# Patient Record
Sex: FEMALE | URBAN - METROPOLITAN AREA
[De-identification: names, ages, dates, MRNs, and addresses within clinical notes are randomized per-mention and may not be internally consistent; named-entity substitution may affect disease eponyms.]

---

## 2022-08-25 ENCOUNTER — APPOINTMENT (RX ONLY)
Dept: URBAN - METROPOLITAN AREA CLINIC 23 | Facility: CLINIC | Age: 37
Setting detail: DERMATOLOGY
End: 2022-08-25

## 2022-08-25 DIAGNOSIS — Z41.9 ENCOUNTER FOR PROCEDURE FOR PURPOSES OTHER THAN REMEDYING HEALTH STATE, UNSPECIFIED: ICD-10-CM

## 2022-09-07 ENCOUNTER — APPOINTMENT (RX ONLY)
Dept: URBAN - METROPOLITAN AREA CLINIC 23 | Facility: CLINIC | Age: 37
Setting detail: DERMATOLOGY
End: 2022-09-07

## 2022-09-07 DIAGNOSIS — Z41.9 ENCOUNTER FOR PROCEDURE FOR PURPOSES OTHER THAN REMEDYING HEALTH STATE, UNSPECIFIED: ICD-10-CM

## 2022-09-07 PROCEDURE — ? PICOWAY LASER

## 2022-09-07 PROCEDURE — ? IN-HOUSE DISPENSING PHARMACY

## 2022-09-07 PROCEDURE — ? ADDITIONAL NOTES

## 2022-09-07 NOTE — PROCEDURE: IN-HOUSE DISPENSING PHARMACY
Product 68 Units Dispensed: 0
Product 27 Unit Type: mg
Product 1 Amount/Unit (Numbers Only): 1
Name Of Product 3: Hydroquinone brightening pads 10%
Send Charges To Patient Encounter: No
Name Of Product 20: June Sandovaler
Product 12 Refills: 2
Product 13 Application Directions: Apply to wound site every 48 hours as indicated.
Product 4 Unit Type: tablets
Name Of Product 18: Syed Bales
Product 7 Application Directions: Apply one drop in each eye
Product 7 Unit Type: kit(s)
Name Of Product 5: Latisse 3ml
Name Of Product 8: Prednisone 20mg
Product 2 Price/Unit (In Dollars): 75
Product 16 Application Directions: Apply to lashes at bedtime only
Product 21 Application Directions: Use as directed
Product 9 Application Directions: Apply to affected area on the face area am as indicated.
Name Of Product 16: Latisse
Name Of Product 4: Archie Fang
Product 19 Application Directions: Apply once daily to each eye.
Name Of Product 14: Valtrex 500mg
Product 17 Application Directions: Take one tablet 20 mg today in office and 10 mg tomorrow if needed for swelling. Dispense in office.
Product 6 Unit Type: grams
Product 9 Unit Type: jar(s)
Product 16 Unit Type: ml
Product 2 Application Directions: Apply a thin layer to face at bedtime only
Product 10 Unit Type: bottle(s)
Name Of Product 7: Shawna Grant
Name Of Product 12: Skin Better Even tone Serum
Name Of Product 22: Calcipotriene/ 5 FLuoracil cream
Name Of Product 17: Prednisone 20 mg
Detail Level: Detailed
Product 4 Application Directions: Apply to face at bedtime.
Product 2 Unit Type: tube(s)
Product 1 Application Directions: Apply to entire face before tretinoin at night time
Name Of Product 6: Tretinoin 0.05% cream
Product 15 Application Directions: Apply to affected skin as indicated
Name Of Product 19: Upneeq ophthalmic solution
Name Of Product 9: Brightening pads 6%
Product 14 Application Directions: Take one tablet prior to procedure. Continue twice daily x 3 days as indicated.
Product 15 Amount/Unit (Numbers Only): [address] [address]
Product 22 Application Directions: Apply small amount to temple and forehead twice daily as indicated x 3 or 5 days
Product 12 Application Directions: Apply to the entire face in the Am and Pm
Name Of Product 2: Tretinoin
Product 14 Amount/Unit (Numbers Only): [address]
Product 11 Application Directions: Take one today and tomorrow
Product 10 Application Directions: Take  1 tablet
Product 6 Application Directions: Apply pea size amount to the face at bedtime only.
Product 13 Unit Type: unit(s)
Product 3 Application Directions: Apply to face am as indicated
Product 20 Application Directions: Apply thin layer at bedtime to entire face
Product 6 Amount/Unit (Numbers Only): 60 [facility]
Name Of Product 15: Justina Londono
Product 16 Amount/Unit (Numbers Only): 5
Name Of Product 1: Brightening pads 10%
Product 8 Application Directions: Take 1 tablet after procedure
Name Of Product 13: Duoderm thin
Product 5 Application Directions: Apply 1 drop to both Eyelashes at bedtime only
Product 13 Price/Unit (In Dollars): $10.00
Name Of Product 10: Prednisone 10mg
Name Of Product 21: Defenage skincare

## 2022-09-07 NOTE — PROCEDURE: ADDITIONAL NOTES
Detail Level: Detailed
Render Risk Assessment In Note?: no
Additional Notes: Patient was informed of  the following diagnoses. Alexandro Rg

## 2022-09-07 NOTE — PROCEDURE: PICOWAY LASER
Post-Care Instructions: I reviewed with the patient in detail post-care instructions. Patient should avoid sun for a minimum of 4 weeks before and after treatment.
Spot Size: 8.0 mm
Frequency (Hz): 6
Handpiece: Zoom
Fluence (J/Cm2): 0.5
Pre-Procedure: Prior to proceeding the treatment areas were cleaned and all present put on their eye protection.
Price (Use Numbers Only, No Special Characters Or $): Rubina 354
Handpiece: Resolve
Spot Size: 6.0 mm
Hide Pulse Duration?: No
Wavelength: 1064 nm
Treatment Number: 1
Frequency (Hz): 2
Fluence (J/Cm2): 1.7
Frequency (Hz): 6Hz
Post-Procedure Care: Immediate endpoint: perifollicular erythema and edema. Vaseline and ice applied. Post care reviewed with patient.
Fluence (J/Cm2): 0.65
Spot Size: 4.0 mm
Wavelength: 532 nm
Detail Level: Generalized
Consent: Written consent obtained, risks reviewed including but not limited to crusting, scabbing, blistering, scarring, darker or lighter pigmentary change, paradoxical hair regrowth, incomplete removal of hair and infection.
External Cooling Fan Speed: 0
Spot Size: 6.5 mm
Passes: 3

## 2022-10-06 ENCOUNTER — APPOINTMENT (RX ONLY)
Dept: URBAN - METROPOLITAN AREA CLINIC 23 | Facility: CLINIC | Age: 37
Setting detail: DERMATOLOGY
End: 2022-10-06

## 2022-10-06 DIAGNOSIS — Z41.9 ENCOUNTER FOR PROCEDURE FOR PURPOSES OTHER THAN REMEDYING HEALTH STATE, UNSPECIFIED: ICD-10-CM

## 2022-10-06 PROCEDURE — ? BOTOX

## 2022-10-06 PROCEDURE — ? PICOWAY LASER

## 2022-10-06 PROCEDURE — ? IN-HOUSE DISPENSING PHARMACY

## 2022-10-06 NOTE — PROCEDURE: PICOWAY LASER
Frequency (Hz): 6
Pre-Procedure: Prior to proceeding the treatment areas were cleaned and all present put on their eye protection.
Wavelength: 1064 nm
Post-Care Instructions: I reviewed with the patient in detail post-care instructions. Patient should avoid sun for a minimum of 4 weeks before and after treatment.
Frequency (Hz): 2
Location Override: face
Post-Procedure Care: Immediate endpoint: perifollicular erythema and edema. Vaseline and ice applied. Post care reviewed with patient.
Consent: Written consent obtained, risks reviewed including but not limited to crusting, scabbing, blistering, scarring, darker or lighter pigmentary change, paradoxical hair regrowth, incomplete removal of hair and infection.
Fluence (J/Cm2): 0.7
Handpiece: Resolve
Spot Size: 4.0 mm
Wavelength: 532 nm
Fluence (J/Cm2): 1.7
Treatment Number: 0
Price (Use Numbers Only, No Special Characters Or $): Rubina 354
Hide Pulse Duration?: No
Spot Size: 6.0 mm
Handpiece: Zoom
Detail Level: Zone
Fluence (J/Cm2): 0.6
Spot Size: 6.5 mm

## 2022-10-06 NOTE — PROCEDURE: IN-HOUSE DISPENSING PHARMACY
Product 27 Dyer/Unit (In Dollars): 0
Name Of Product 18: Delton Holstein
Product 2 Amount/Unit (Numbers Only): 1
Product 59 Unit Type: mg
Name Of Product 4: Mahad Agosto
Product 7 Unit Type: kit(s)
Product 15 Application Directions: Apply to affected skin as indicated
Product 5 Application Directions: Apply 1 drop to both Eyelashes at bedtime only
Product 20 Application Directions: Apply thin layer at bedtime to entire face
Product 12 Unit Type: bottle(s)
Product 1 Application Directions: Apply to entire face at night time
Name Of Product 8: Prednisone 20mg
Product 11 Amount/Unit (Numbers Only): 2
Product 6 Amount/Unit (Numbers Only): 6[address] [facility]
Name Of Product 13: Duoderm thin
Product 15 Unit Type: ml
Product 10 Application Directions: Take  1 tablet
Product 21 Application Directions: Use as directed
Product 14 Amount/Unit (Numbers Only): [address]
Name Of Product 16: Latisse
Product 1 Unit Type: jar(s)
Product 4 Application Directions: Apply to face at bedtime.
Product 17 Application Directions: Take one tablet 20 mg today in office and 10 mg tomorrow if needed for swelling. Dispense in office.
Name Of Product 15: Luisito Glass
Render Refills If Set To 0: No
Product 3 Application Directions: Apply to face am as indicated
Name Of Product 20: Rhonda Haskins
Name Of Product 10: Prednisone 10mg
Product 7 Application Directions: Apply one drop in each eye
Name Of Product 1: Brightening pads 10%
Product 12 Application Directions: Apply to the entire face in the Am and Pm
Product 17 Unit Type: tablets
Product 16 Amount/Unit (Numbers Only): 5
Name Of Product 14: Valtrex 500mg
Product 6 Application Directions: Apply pea size amount to the face at bedtime only.
Product 11 Application Directions: Take one today in office and one tablet tomorrow for swelling
Detail Level: Detailed
Product 15 Amount/Unit (Numbers Only): [address] [address]
Product 22 Application Directions: Apply small amount to temple and forehead twice daily as indicated x 3 or 5 days
Product 2 Unit Type: tube(s)
Product 6 Unit Type: grams
Name Of Product 17: Prednisone 20 mg
Product 14 Application Directions: Take one tablet prior to procedure. Continue twice daily x 3 days as indicated.
Product 19 Application Directions: Apply once daily to each eye.
Name Of Product 3: Hydroquinone brightening pads 10%
Name Of Product 25: Valium 5mg
Product 13 Price/Unit (In Dollars): $10.00
Name Of Product 7: Alejandra Hsu
Product 9 Application Directions: Apply to affected area on the face area am as indicated.
Name Of Product 12: Skin Better Even tone Serum
Name Of Product 21: Defenage skincare
Name Of Product 2: Tri-Ashley cream
Name Of Product 6: Tretinoin 0.05% cream
Name Of Product 11: Prednisone 10 mg
Product 8 Application Directions: Take 1 tablet after procedure
Product 13 Application Directions: Apply to wound site every 48 hours as indicated.
Name Of Product 22: Calcipotriene/ 5 FLuoracil cream
Product 16 Application Directions: Apply to lashes at bedtime only
Product 13 Unit Type: unit(s)
Name Of Product 19: Upneeq ophthalmic solution
Product 2 Application Directions: Apply a thin layer to face at bedtime only
Name Of Product 5: Latisse 5ml
Name Of Product 9: Brightening pads 6%

## 2022-10-06 NOTE — PROCEDURE: BOTOX
Additional Area 5 Location: neck bands
Left Pupillary Line Units: 0
Show Forehead Units: Yes
Dilution (U/0.1 Cc): 2.5
Additional Area 2 Location: forehead 2.0 cm above brows (touch up)
Show Right And Left Periorbital Units: No
Periorbital Skin Units: 4
Comments: NC
Consent: Written consent obtained. Risks include but not limited to lid/brow ptosis, bruising, swelling, diplopia, temporary effect, incomplete chemical denervation.
Additional Area 3 Location: upper lips
Additional Area 6 Location: lateral brows
Detail Level: Detailed
Post-Care Instructions: Patient instructed to not lie down for 4 hours and limit physical activity for 24 hours. Patient instructed not to travel by airplane for 48 hours.
Forehead Units: 4 [facility]
Additional Area 4 Location: bunny lines
Glabellar Complex Units: 10
Expiration Date (Month Year): 2024/05
Lot #: [id number]
Show Inventory Tab: Show
Additional Area 1 Location: lat brows
Additional Area 1 Units: 2

## 2022-10-26 ENCOUNTER — APPOINTMENT (RX ONLY)
Dept: URBAN - METROPOLITAN AREA CLINIC 23 | Facility: CLINIC | Age: 37
Setting detail: DERMATOLOGY
End: 2022-10-26

## 2022-10-26 DIAGNOSIS — Z41.9 ENCOUNTER FOR PROCEDURE FOR PURPOSES OTHER THAN REMEDYING HEALTH STATE, UNSPECIFIED: ICD-10-CM

## 2022-10-26 PROCEDURE — ? BOTOX

## 2022-10-26 NOTE — PROCEDURE: BOTOX
Additional Area 6 Location: lateral brows
Forehead Units: 4
Show Additional Area 5: Yes
Lcl Root Units: 0
Additional Area 4 Location: bunny lines
Show Lcl Units: No
Detail Level: Detailed
Expiration Date (Month Year): 2024/05
Lot #: [id number]
Post-Care Instructions: Patient instructed to not lie down for 4 hours and limit physical activity for 24 hours. Patient instructed not to travel by airplane for 48 hours.
Additional Area 3 Location: high forehead
Consent: Written consent obtained. Risks include but not limited to lid/brow ptosis, bruising, swelling, diplopia, temporary effect, incomplete chemical denervation.
Dilution (U/0.1 Cc): 2.5
Additional Area 2 Location: glabella
Additional Area 5 Location: neck bands
Show Inventory Tab: Show
Additional Area 1 Location: [facility]

## 2022-12-06 ENCOUNTER — APPOINTMENT (RX ONLY)
Dept: URBAN - METROPOLITAN AREA CLINIC 23 | Facility: CLINIC | Age: 37
Setting detail: DERMATOLOGY
End: 2022-12-06

## 2022-12-06 DIAGNOSIS — Z41.9 ENCOUNTER FOR PROCEDURE FOR PURPOSES OTHER THAN REMEDYING HEALTH STATE, UNSPECIFIED: ICD-10-CM

## 2022-12-06 PROCEDURE — ? RECOMMENDATIONS

## 2022-12-06 NOTE — PROCEDURE: RECOMMENDATIONS
Detail Level: Zone
Render Risk Assessment In Note?: no
Recommendations (Free Text): Morning:Brighten pads w/o hydroquinone \\nVitamin c\\Crispin skin tone corrector \\nSunscreen\\n\\nNighttime: Wash\\nTretinoin\\nSkin even tone corrector

## 2023-04-19 ENCOUNTER — RX ONLY (OUTPATIENT)
Age: 38
Setting detail: RX ONLY
End: 2023-04-19

## 2023-04-19 ENCOUNTER — APPOINTMENT (RX ONLY)
Dept: URBAN - METROPOLITAN AREA CLINIC 23 | Facility: CLINIC | Age: 38
Setting detail: DERMATOLOGY
End: 2023-04-19

## 2023-04-19 DIAGNOSIS — Z41.9 ENCOUNTER FOR PROCEDURE FOR PURPOSES OTHER THAN REMEDYING HEALTH STATE, UNSPECIFIED: ICD-10-CM

## 2023-04-19 PROCEDURE — ? RECOMMENDATIONS

## 2023-04-19 PROCEDURE — ? PICOWAY LASER

## 2023-04-19 PROCEDURE — ? ADDITIONAL NOTES

## 2023-04-19 RX ORDER — HYDROQUINONE/HYDROCORTISONE 6 %-0.5 %
EMULSION (GRAM) TOPICAL
Qty: 30 | Refills: 0 | Status: ERX | COMMUNITY
Start: 2023-04-19

## 2023-04-19 RX ORDER — PHARMACY COMPOUNDING ACCESSORY
EACH MISCELLANEOUS
Qty: 30 | Refills: 0 | Status: CANCELLED

## 2023-04-19 NOTE — PROCEDURE: ADDITIONAL NOTES
Detail Level: Simple
Additional Notes: Clair maldonado [facility] pharmacy) a pea sized to the entire face mix with tretinoin
Render Risk Assessment In Note?: no

## 2023-04-19 NOTE — PROCEDURE: PICOWAY LASER
Spot Size: 6.0 mm x 6.0 mm
External Cooling Fan Speed: 0
Wavelength: 532 nm
Fluence (J/Cm2): 0.7
Post-Procedure Care: Immediate endpoint: perifollicular erythema and edema. Vaseline and ice applied. Post care reviewed with patient.
Handpiece: Zoom
Price (Use Numbers Only, No Special Characters Or $): Rubina 354
Spot Size: 6.0 mm
Fluence (J/Cm2): 1.9
Frequency (Hz): 6
Consent: Written consent obtained, risks reviewed including but not limited to crusting, scabbing, blistering, scarring, darker or lighter pigmentary change, paradoxical hair regrowth, incomplete removal of hair and infection.
Fluence (J/Cm2): 0.65
Wavelength: 1064 nm
Frequency (Hz): 6 Hz
Hide Pulse Duration?: No
Post-Care Instructions: I reviewed with the patient in detail post-care instructions. Patient should avoid sun for a minimum of 4 weeks before and after treatment.
Handpiece: Resolve
Pre-Procedure: Prior to proceeding the treatment areas were cleaned and all present put on their eye protection.
Detail Level: Zone

## 2023-07-19 ENCOUNTER — APPOINTMENT (RX ONLY)
Dept: URBAN - METROPOLITAN AREA CLINIC 23 | Facility: CLINIC | Age: 38
Setting detail: DERMATOLOGY
End: 2023-07-19

## 2023-07-19 DIAGNOSIS — Z41.9 ENCOUNTER FOR PROCEDURE FOR PURPOSES OTHER THAN REMEDYING HEALTH STATE, UNSPECIFIED: ICD-10-CM

## 2023-07-19 PROCEDURE — ? CANDELA NORDLYS

## 2023-07-19 NOTE — PROCEDURE: CANDELA NORDLYS
Pulse Duration In Ms (Will Not Render If Zero): 8
Location: anterior thighs
Location: decolletage of the chest
Hsv Prophylaxis Prescription: Valtrex 500mg BID starting the day of procedures and continuing until all sites healed
Location: full face
Fluence In J/Cm2(Optional): 7
Passes (Will Not Render If Zero): 1
Location: neck
Energy In Mj (Optional): 41.5
Wavelength (Optional): 1550 nm
Passes (Optional): 3
Fluence In J/Cm2(Optional): 15.4
Treatment Number (Will Not Render If Zero): 0
Pulse Time In Ms (Will Not Render If Zero): 1.5
Modality: Frax
Detail Level: Zone
Pulse Duration In Ms (Will Not Render If Zero): 2.5
Passes (Will Not Render If Zero): 2
Energy In Mj (Optional): 40.00
Pulse Delay In Ms (Optional): 9.4
Actual Kj Used (Optional): 0.94
Fluence In J/Cm2(Optional): 7.6
Location: cheeks
Scan Width (Optional): 10mm
Location: chin
Pulse Time (Will Not Render If Zero): 10
Price (Use Numbers Only, No Special Characters Or $): [address]
Pain Level (Optional): 2 (moderate)
Pulse Duration In Ms (Will Not Render If Zero): 12
% Coverage: [id number]
Energy In Mj (Optional): 15.5
Energy In Mj (Optional): 60.7
Topical Anesthesia?: 23% lidocaine, 7% tetracaine
Consent: Written consent obtained, risks reviewed including but not limited to crusting, scabbing, blistering, scarring, darker or lighter pigmentary change, and/or incomplete removal.
Spot Size (Optional): 1.5 nm
Location: nose
Post-Care Instructions: I reviewed with the patient in detail post-care instructions.
Spot Size (Optional): 3 nm
Scan Width (Optional): 4mm
Location: forehead
Pulse Duration In Ms (Will Not Render If Zero): 25
Eye Shielding Text (Leave Blank If Unwanted- Will Be Inserted If Selecting Eye Shields): The intraocular eye shields were placed. 2 drops of intraocular tetracaine HCL ophthalmic 0.5% solution was administered. The eye shields were coated with ophthalmic bacitracin prior to insertion. After the shields were removed the eyes were flushed with normal saline.
Fluence In J/Cm2(Optional): 8.0
% Coverage: 20
Length Topical Anesthesia Applied (Optional): 30 minutes
% Coverage: [id number] [address]
Hsv Prophylaxis: no

## 2023-08-23 ENCOUNTER — APPOINTMENT (RX ONLY)
Dept: URBAN - METROPOLITAN AREA CLINIC 23 | Facility: CLINIC | Age: 38
Setting detail: DERMATOLOGY
End: 2023-08-23

## 2023-08-23 DIAGNOSIS — Z41.9 ENCOUNTER FOR PROCEDURE FOR PURPOSES OTHER THAN REMEDYING HEALTH STATE, UNSPECIFIED: ICD-10-CM

## 2024-01-17 ENCOUNTER — APPOINTMENT (RX ONLY)
Dept: URBAN - METROPOLITAN AREA CLINIC 23 | Facility: CLINIC | Age: 39
Setting detail: DERMATOLOGY
End: 2024-01-17

## 2024-01-17 DIAGNOSIS — Z41.9 ENCOUNTER FOR PROCEDURE FOR PURPOSES OTHER THAN REMEDYING HEALTH STATE, UNSPECIFIED: ICD-10-CM

## 2024-01-17 PROCEDURE — ? INVENTORY

## 2024-01-17 PROCEDURE — ? IN-HOUSE DISPENSING PHARMACY

## 2024-01-17 PROCEDURE — ? CHEMICAL PEEL

## 2024-01-17 NOTE — PROCEDURE: CHEMICAL PEEL
Detail Level: Zone
Number Of Layers: 3
Treatment Time (Optional): 2-3 minutes
Prep: The treated area was degreased with pre-peel cleanser, and vaseline was applied for protection of mucous membranes.
Treatment Number: 0
Consent: Prior to the procedure, written consent was obtained and risks were reviewed, including but not limited to: redness, peeling, blistering, pigmentary change, scarring, infection, and pain.
Price (Use Numbers Only, No Special Characters Or $): 472
Lot Number (Optional): [id number]
Expiration Date (Optional): 07/24
Chemical Peel: Vi
Post-Care Instructions: I reviewed with the patient in detail post-care instructions. Patient should avoid sun exposure and wear sun protection.
Post Peel Care: After the procedure, a post-peel cream was applied to the treated areas. Post-care instructions were reviewed with the patient.

## 2024-01-17 NOTE — PROCEDURE: IN-HOUSE DISPENSING PHARMACY
Product 36 Unit Type: mg
Product 6 Units Dispensed: 0
Product 8 Application Directions: Take 1 tablet before the procedure by mouth
Product 14 Application Directions: Take one tablet prior to procedure. Continue twice daily x 3 days as indicated.
Product 18 Refills: 2
Product 7 Refills: 1
Name Of Product 1: Prednisone 10mg
Product 3 Application Directions: Apply to face daily as indicated
Product 3 Unit Type: jar(s)
Product 22 Application Directions: Apply small amount to temple and forehead twice daily as indicated x 3 or 5 days
Product 19 Unit Type: bottle(s)
Product 8 Unit Type: tablets
Product 6 Application Directions: Take 1 pill by mouth today,and another tomorrow
Product 18 Amount/Unit (Numbers Only): 20
Name Of Product 20: Triluma
Name Of Product 26: Tylenol 500mg
Product 12 Application Directions: Apply to the entire face in the Am and Pm
Product 17 Application Directions: Take one tablet 20 mg today in office and 10 mg tomorrow if needed for swelling. Dispense in office.
Product 23 Amount/Unit (Numbers Only): 5
Product 4 Price/Unit (In Dollars): 0.00
Name Of Product 9: Brightening pads 6%
Name Of Product 15: Thai
Product 1 Application Directions: Take one tablet post injection
Product 16 Application Directions: Apply to lashes at bedtime only
Name Of Product 24: Tretinoin 0.05% cream
Product 1 Amount/Unit (Numbers Only): 10
Product 10 Application Directions: Apply to face once daily
Name Of Product 14: Valtrex 1 gram
Product 21 Unit Type: tube(s)
Send Charges To Patient Encounter: No
Product 11 Application Directions: Take one tablet prior to procedure and one tablet daily x 3 days
Name Of Product 8: Valium 5 mg
Product 4 Application Directions: Apply to Eyelids at bedtime
Product 5 Unit Type: kit(s)
Product 16 Unit Type: ml
Name Of Product 22: Calcipotriene/ 5 FLuoracil cream
Name Of Product 6: Prednisone 10 mg
Name Of Product 17: Prednisone 20 mg
Product 15 Amount/Unit (Numbers Only): 30
Product 19 Application Directions: Apply thin layer at bedtime to melasma.
Product 13 Price/Unit (In Dollars): $10.00
Name Of Product 12: Skin Better Even tone Serum
Name Of Product 5: Upneeq
Product 18 Application Directions: Mix with cerave cream and apply to body daily after shower
Product 24 Application Directions: Apply pea size amount to entire face at bedtime only.
Name Of Product 16: Latisse
Product 14 Amount/Unit (Numbers Only): 500
Product 13 Application Directions: Apply to wound site every 48 hours as indicated.
Product 23 Application Directions: Take by mouth.
Product 2 Application Directions: Apply a pea size amount to Entire face  at bedtime
Product 7 Application Directions: Apply one drop in each eye
Product 21 Application Directions: Apply pea size amount to entire face at bedtime only
Name Of Product 4: Latisse 5 ml
Detail Level: Detailed
Name Of Product 25: Valium 5mg
Product 5 Application Directions: Apply one drop to each eye
Product 13 Unit Type: unit(s)
Name Of Product 23: Diazepam (Valium)
Product 26 Application Directions: Take one pill by mouth
Name Of Product 18: Tretinoin cream 0.05%
Product 1 Unit Type: grams
Name Of Product 3: Hydroquinone brightening pads 4%
Product 11 Amount/Unit (Numbers Only): 3
Product 20 Application Directions: Apply thin layer at bedtime to entire face
Product 15 Application Directions: Apply to affected skin as indicated
Product 9 Application Directions: Apply to affected area on the face area am as indicated.
Name Of Product 2: Tretinoin 0.05%
Name Of Product 13: Duoderm thin

## 2024-03-01 ENCOUNTER — APPOINTMENT (RX ONLY)
Dept: URBAN - METROPOLITAN AREA CLINIC 23 | Facility: CLINIC | Age: 39
Setting detail: DERMATOLOGY
End: 2024-03-01

## 2024-03-01 DIAGNOSIS — Z41.9 ENCOUNTER FOR PROCEDURE FOR PURPOSES OTHER THAN REMEDYING HEALTH STATE, UNSPECIFIED: ICD-10-CM

## 2024-03-01 PROCEDURE — ? PICOWAY LASER

## 2024-03-01 NOTE — PROCEDURE: PICOWAY LASER
Treatment Number: 0
Fluence (J/Cm2): 0.8
Location Override: forehead and upper lip
Spot Size: 6.0 mm x 6.0 mm
Hide Pulse Duration?: No
Detail Level: Zone
Wavelength: 1064 nm
Wavelength: 532 nm
Fluence (J/Cm2): 0.6
Handpiece: Resolve
Handpiece: Zoom
Frequency (Hz): 6Hz
Fluence (J/Cm2): 1.7
Pre-Procedure: Prior to proceeding the treatment areas were cleaned and all present put on their eye protection.
Consent: Written consent obtained, risks reviewed including but not limited to crusting, scabbing, blistering, scarring, darker or lighter pigmentary change, paradoxical hair regrowth, incomplete removal of hair and infection.
Post-Care Instructions: I reviewed with the patient in detail post-care instructions. Patient should avoid sun for a minimum of 4 weeks before and after treatment.
Spot Size: 8.0 mm
Post-Procedure Care: Immediate endpoint: perifollicular erythema and edema. Vaseline and ice applied. Post care reviewed with patient.
Price (Use Numbers Only, No Special Characters Or $): 900
Frequency (Hz): 6

## 2025-04-02 ENCOUNTER — APPOINTMENT (OUTPATIENT)
Dept: URBAN - METROPOLITAN AREA CLINIC 23 | Facility: CLINIC | Age: 40
Setting detail: DERMATOLOGY
End: 2025-04-02

## 2025-04-02 ENCOUNTER — RX ONLY (RX ONLY)
Age: 40
End: 2025-04-02

## 2025-04-02 DIAGNOSIS — Z41.9 ENCOUNTER FOR PROCEDURE FOR PURPOSES OTHER THAN REMEDYING HEALTH STATE, UNSPECIFIED: ICD-10-CM

## 2025-04-02 PROCEDURE — ? ADDITIONAL NOTES

## 2025-04-02 PROCEDURE — ? IN-HOUSE DISPENSING PHARMACY

## 2025-04-02 PROCEDURE — ? PICOWAY LASER

## 2025-04-02 RX ORDER — FLUOCINOLONE ACETONIDE, HYDROQUINONE, AND TRETINOIN .1; 40; .5 MG/G; MG/G; MG/G
CREAM TOPICAL
Qty: 30 | Refills: 1 | Status: ERX | COMMUNITY
Start: 2025-04-02

## 2025-04-02 NOTE — PROCEDURE: IN-HOUSE DISPENSING PHARMACY
Product 38 Unit Type: mg
Product 44 Units Dispensed: 0
Render Product Pricing In Note: No
Product 1 Unit Type: jar(s)
Name Of Product 3: Hydroquinone brightening pads 10%
Product 13 Application Directions: Apply to wound site every 48 hours as indicated.
Product 12 Refills: 2
Product 16 Application Directions: Apply to lashes at bedtime only
Product 5 Amount/Unit (Numbers Only): 1
Name Of Product 19: Triluma
Product 21 Application Directions: Apply pea size amount to entire face at bedtime only
Product 8 Unit Type: tablets
Product 4 Application Directions: Apply to Eyebrows/eye lashes at bedtime
Name Of Product 2: Tretinoin 0.05%
Product 13 Unit Type: unit(s)
Product 24 Price/Unit (In Dollars): 20
Product 16 Unit Type: ml
Product 4 Amount/Unit (Numbers Only): 5 mL
Name Of Product 1: Hydroquinone brightens pads 10%
Product 11 Application Directions: Take one pill BID once daily
Product 21 Unit Type: tube(s)
Product 7 Unit Type: kit(s)
Name Of Product 9: Brightening pads 8%
Name Of Product 14: Valtrex 1 gram
Product 3 Application Directions: Apply to face daily as indicated
Product 24 Application Directions: Apply pea size amount to entire face at bedtime only.
Product 10 Application Directions: Apply to face once daily
Product 20 Unit Type: bottle(s)
Product 15 Application Directions: Apply to affected skin as indicated
Product 2 Application Directions: apply a pea sized amount to entire face at bedtime
Name Of Product 5: Upneeq
Detail Level: Detailed
Name Of Product 13: Duoderm thin
Product 23 Amount/Unit (Numbers Only): 5
Product 18 Application Directions: Mix with cerave cream and apply to body daily after shower
Name Of Product 16: Latisse
Product 4 Price/Unit (In Dollars): 0.00
Product 14 Amount/Unit (Numbers Only): 500
Name Of Product 25: Valium 5mg
Product 5 Application Directions: Apply one drop to each eye
Product 8 Application Directions: Take 1 tablet before the procedure by mouth
Name Of Product 24: Tretinoin 0.05% cream
Product 26 Application Directions: Take one pill by mouth
Name Of Product 15: Thai
Product 12 Application Directions: Apply to the entire face in the Am and Pm
Product 20 Application Directions: Apply thin layer at bedtime to entire face
Name Of Product 18: Tretinoin cream 0.05%
Name Of Product 6: Valtrex 1mg
Name Of Product 23: Diazepam (Valium)
Name Of Product 8: Valium 5 mg
Name Of Product 17: Prednisone 20 mg
Product 19 Application Directions: Apply thin layer at bedtime to melasma.
Name Of Product 22: Calcipotriene/ 5 FLuoracil cream
Product 23 Application Directions: Take by mouth.
Product 7 Application Directions: Apply one drop in each eye
Product 15 Amount/Unit (Numbers Only): 30
Product 1 Application Directions: Apply pad to affected areas on the face in AM
Name Of Product 26: Tylenol 500mg
Product 13 Price/Unit (In Dollars): $10.00
Name Of Product 12: Skin Better Even tone Serum
Product 9 Application Directions: Apply to affected area on the face area am as indicated.
Name Of Product 4: Latisse 5 ml
Product 6 Application Directions: Take 1 pill by mouth daily for three days.
Product 14 Application Directions: Take one tablet prior to procedure. Continue twice daily x 3 days as indicated.
Product 2 Unit Type: grams
Product 17 Application Directions: Take one tablet 20 mg today in office and 10 mg tomorrow if needed for swelling. Dispense in office.
Product 22 Application Directions: Apply small amount to temple and forehead twice daily as indicated x 3 or 5 days

## 2025-04-02 NOTE — PROCEDURE: [place] LASER
Hide Pulse Duration?: No
Frequency (Hz): 6 Hz
Wavelength: 1064 nm
Location Override: forehead, chin, under eyes, upper lip spots
Pre-Procedure: Prior to proceeding the treatment areas were cleaned and all present put on their eye protection.
Spot Size: 6.0 mm x 6.0 mm
Fluence (J/Cm2): 1.5
Treatment Number: 0
Spot Size: 8.0 mm
Post-Procedure Care: Immediate endpoint: perifollicular erythema and edema. Vaseline and ice applied. Post care reviewed with patient.
Price (Use Numbers Only, No Special Characters Or $): 149
Wavelength: 532 nm
Detail Level: Zone
Consent: Written consent obtained, risks reviewed including but not limited to crusting, scabbing, blistering, scarring, darker or lighter pigmentary change, paradoxical hair regrowth, incomplete removal of hair and infection.
Fluence (J/Cm2): 1.9
Handpiece: Resolve
Fluence (J/Cm2): 0.65
Post-Care Instructions: I reviewed with the patient in detail post-care instructions. Patient should avoid sun for a minimum of 4 weeks before and after treatment.
Handpiece: Zoom
Fluence (J/Cm2): 0.7

## 2025-04-02 NOTE — PROCEDURE: ADDITIONAL NOTES
Render Risk Assessment In Note?: no
Additional Notes: AM: HQ 10% pads, trio luxe moisturizer, SPF (Pavise and alastin tinted mixed)\\n\\nPM: Tri-leonides Rx to entire face, pea size amount, trio luxe\\nRecommended trio luxe moisturizer by skin better
Detail Level: Zone

## 2025-05-30 ENCOUNTER — APPOINTMENT (OUTPATIENT)
Dept: URBAN - METROPOLITAN AREA CLINIC 23 | Facility: CLINIC | Age: 40
Setting detail: DERMATOLOGY
End: 2025-05-30

## 2025-05-30 DIAGNOSIS — L81.4 OTHER MELANIN HYPERPIGMENTATION: ICD-10-CM

## 2025-05-30 DIAGNOSIS — Z71.89 OTHER SPECIFIED COUNSELING: ICD-10-CM

## 2025-05-30 DIAGNOSIS — D22 MELANOCYTIC NEVI: ICD-10-CM

## 2025-05-30 PROBLEM — D22.5 MELANOCYTIC NEVI OF TRUNK: Status: ACTIVE | Noted: 2025-05-30

## 2025-05-30 PROCEDURE — ? SUNSCREEN RECOMMENDATIONS

## 2025-05-30 PROCEDURE — ? COUNSELING

## 2025-05-30 PROCEDURE — 99213 OFFICE O/P EST LOW 20 MIN: CPT

## 2025-05-30 PROCEDURE — ? RECOMMENDATIONS

## 2025-05-30 ASSESSMENT — LOCATION DETAILED DESCRIPTION DERM
LOCATION DETAILED: RIGHT INFERIOR UPPER BACK
LOCATION DETAILED: LEFT MEDIAL BREAST 10-11:00 REGION

## 2025-05-30 ASSESSMENT — LOCATION ZONE DERM: LOCATION ZONE: TRUNK

## 2025-05-30 ASSESSMENT — LOCATION SIMPLE DESCRIPTION DERM
LOCATION SIMPLE: RIGHT UPPER BACK
LOCATION SIMPLE: LEFT BREAST